# Patient Record
Sex: FEMALE | Race: WHITE | Employment: FULL TIME | URBAN - METROPOLITAN AREA
[De-identification: names, ages, dates, MRNs, and addresses within clinical notes are randomized per-mention and may not be internally consistent; named-entity substitution may affect disease eponyms.]

---

## 2017-05-11 ENCOUNTER — ALLSCRIPTS OFFICE VISIT (OUTPATIENT)
Dept: OTHER | Facility: OTHER | Age: 42
End: 2017-05-11

## 2017-09-05 ENCOUNTER — ALLSCRIPTS OFFICE VISIT (OUTPATIENT)
Dept: OTHER | Facility: OTHER | Age: 42
End: 2017-09-05

## 2018-01-15 VITALS
HEART RATE: 100 BPM | DIASTOLIC BLOOD PRESSURE: 60 MMHG | BODY MASS INDEX: 19.85 KG/M2 | SYSTOLIC BLOOD PRESSURE: 90 MMHG | HEIGHT: 69 IN | WEIGHT: 134 LBS | RESPIRATION RATE: 12 BRPM | OXYGEN SATURATION: 96 %

## 2018-01-29 DIAGNOSIS — L28.0 LICHEN SIMPLEX CHRONICUS: Primary | ICD-10-CM

## 2018-01-29 RX ORDER — ADAPALENE AND BENZOYL PEROXIDE .1; 2.5 G/100G; G/100G
GEL TOPICAL
COMMUNITY
Start: 2014-04-29

## 2018-01-29 RX ORDER — CYCLOSPORINE 0.5 MG/ML
EMULSION OPHTHALMIC
COMMUNITY

## 2018-01-29 RX ORDER — FEXOFENADINE HCL 180 MG/1
1 TABLET ORAL DAILY
COMMUNITY

## 2018-01-29 RX ORDER — MOMETASONE FUROATE 1 MG/G
CREAM TOPICAL DAILY
Qty: 45 G | Refills: 0 | Status: SHIPPED | OUTPATIENT
Start: 2018-01-29 | End: 2018-09-17 | Stop reason: SDUPTHER

## 2018-01-29 RX ORDER — MOMETASONE FUROATE 1 MG/G
CREAM TOPICAL 2 TIMES DAILY
COMMUNITY
Start: 2016-05-05 | End: 2018-01-29 | Stop reason: SDUPTHER

## 2018-01-29 RX ORDER — MULTIVITAMIN
TABLET ORAL
COMMUNITY

## 2018-01-29 RX ORDER — MONTELUKAST SODIUM 10 MG/1
1 TABLET ORAL DAILY
COMMUNITY
Start: 2017-09-05 | End: 2018-01-30 | Stop reason: SDUPTHER

## 2018-01-29 RX ORDER — DIPHENHYDRAMINE HCL 25 MG
CAPSULE ORAL
COMMUNITY

## 2018-01-29 RX ORDER — MOMETASONE FUROATE 50 UG/1
SPRAY, METERED NASAL
COMMUNITY

## 2018-01-30 DIAGNOSIS — J45.20 MILD INTERMITTENT ASTHMA WITHOUT COMPLICATION: Primary | ICD-10-CM

## 2018-01-30 RX ORDER — MONTELUKAST SODIUM 10 MG/1
10 TABLET ORAL DAILY
Qty: 90 TABLET | Refills: 0 | Status: SHIPPED | OUTPATIENT
Start: 2018-01-30

## 2018-07-26 ENCOUNTER — OFFICE VISIT (OUTPATIENT)
Dept: FAMILY MEDICINE CLINIC | Facility: CLINIC | Age: 43
End: 2018-07-26
Payer: COMMERCIAL

## 2018-07-26 VITALS
SYSTOLIC BLOOD PRESSURE: 90 MMHG | HEART RATE: 60 BPM | HEIGHT: 69 IN | OXYGEN SATURATION: 98 % | RESPIRATION RATE: 12 BRPM | TEMPERATURE: 98.9 F | DIASTOLIC BLOOD PRESSURE: 60 MMHG

## 2018-07-26 DIAGNOSIS — Z48.02 VISIT FOR SUTURE REMOVAL: Primary | ICD-10-CM

## 2018-07-26 PROBLEM — J30.89 PERENNIAL ALLERGIC RHINITIS: Status: ACTIVE | Noted: 2017-09-05

## 2018-07-26 PROCEDURE — 99213 OFFICE O/P EST LOW 20 MIN: CPT | Performed by: NURSE PRACTITIONER

## 2018-07-26 RX ORDER — FERROUS SULFATE 325(65) MG
325 TABLET ORAL
COMMUNITY

## 2018-07-26 NOTE — ASSESSMENT & PLAN NOTE
UTD on tetanus  3 sutures removed without difficulty Steri-strips applied  Counseled on keeping wound clean  Advised to change dressing daily or more frequently if dressing becomes more stress soiled  To continue to monitor signs of infection  Follow-up as needed

## 2018-07-26 NOTE — PROGRESS NOTES
Assessment/Plan:    Visit for suture removal  UTD on tetanus  3 sutures removed without difficulty Steri-strips applied  Counseled on keeping wound clean  Advised to change dressing daily or more frequently if dressing becomes more stress soiled  To continue to monitor signs of infection  Follow-up as needed  Diagnoses and all orders for this visit:    Visit for suture removal  -     mupirocin (BACTROBAN) 2 % ointment; Apply topically 3 (three) times a day    Other orders  -     ferrous sulfate 325 (65 Fe) mg tablet; Take 325 mg by mouth daily with breakfast  -     Suture removal          Subjective:      Patient ID: Qiana Vega is a 37 y o  female  [facility] presents for a suture removal   She fell and sustained a laceration to her left knee 7 days ago  She was seen at [facility] ER  Three sutures were placed  She was also on a 7 day course of an oral antibiotic  She cannot recall the name  Denies fever, chills, warmth, erythema or drainage from abrasion  She is up-to-date on her tetanus        Suture / Staple Removal         The following portions of the patient's history were reviewed and updated as appropriate:   She   Patient Active Problem List    Diagnosis Date Noted    Visit for suture removal 07/26/2018    Perennial allergic rhinitis 98/99/5148    Lichen simplex chronicus 05/05/2016     Current Outpatient Prescriptions   Medication Sig Dispense Refill    Adapalene-Benzoyl Peroxide (EPIDUO) 0 1-2 5 % gel Apply topically      cycloSPORINE (RESTASIS) 0 05 % ophthalmic emulsion Apply to eye      diphenhydrAMINE (BENADRYL) 25 mg capsule Take by mouth      ferrous sulfate 325 (65 Fe) mg tablet Take 325 mg by mouth daily with breakfast      fexofenadine (ALLEGRA) 180 MG tablet Take 1 tablet by mouth daily      mometasone (ELOCON) 0 1 % cream Apply topically daily 45 g 0    mometasone (NASONEX) 50 mcg/act nasal spray into each nostril      montelukast (SINGULAIR) 10 mg tablet Take 1 tablet (10 mg total) by mouth daily 90 tablet 0    Multiple Vitamin (MULTI-VITAMIN DAILY) TABS Take by mouth      mupirocin (BACTROBAN) 2 % ointment Apply topically 3 (three) times a day 22 g 0     No current facility-administered medications for this visit  She is allergic to acetaminophen       Review of Systems   Constitutional: Negative  Respiratory: Negative  Cardiovascular: Negative  Skin: Positive for wound  Neurological: Negative  Psychiatric/Behavioral: Negative  BP 90/60   Pulse 60   Temp 98 9 °F (37 2 °C) (Tympanic)   Resp 12   Ht 5' 8 5" (1 74 m)   LMP 07/26/2018   SpO2 98%     Objective:     Physical Exam   Constitutional: She is oriented to person, place, and time  She appears well-developed and well-nourished  Neurological: She is oriented to person, place, and time  Skin:   Presence of a 17 mm x 10 mm healing laceration on left knee  Wound approximated  No erythema, swelling, warmth, or drainage  3 sutures in place  Psychiatric: She has a normal mood and affect  Her behavior is normal  Judgment and thought content normal        Suture removal  Date/Time: 7/26/2018 11:02 AM  Performed by: Selvin Foster by: Kristy Chavez     Patient location:  ED  Other Assisting Provider: No    Consent:     Consent obtained:  Verbal    Consent given by:  Patient    Risks discussed:  Bleeding, pain and wound separation    Alternatives discussed:  No treatment, delayed treatment and referral  Universal protocol:     Procedure explained and questions answered to patient or proxy's satisfaction: yes      Relevant documents present and verified: yes      Immediately prior to procedure, a time out was called: yes      Patient identity confirmed:  Verbally with patient  Location:     Laterality:  Left    Location:  Lower extremity  Procedure details:      Tools used:  Suture removal kit, scissors and tweezers    Wound appearance:  No sign(s) of infection, good wound healing and clean    Number of sutures removed:  3  Post-procedure details:     Post-removal:  Steri-Strips applied and dressing applied    Patient tolerance of procedure:   Tolerated well, no immediate complications

## 2018-09-07 LAB — HBA1C MFR BLD HPLC: 5 %

## 2018-09-17 ENCOUNTER — OFFICE VISIT (OUTPATIENT)
Dept: DERMATOLOGY | Facility: CLINIC | Age: 43
End: 2018-09-17
Payer: COMMERCIAL

## 2018-09-17 DIAGNOSIS — L28.0 LICHEN SIMPLEX CHRONICUS: Primary | ICD-10-CM

## 2018-09-17 DIAGNOSIS — D22.9 NEVUS: ICD-10-CM

## 2018-09-17 DIAGNOSIS — Z13.89 SCREENING FOR SKIN CONDITION: ICD-10-CM

## 2018-09-17 PROCEDURE — 99213 OFFICE O/P EST LOW 20 MIN: CPT | Performed by: DERMATOLOGY

## 2018-09-17 RX ORDER — MOMETASONE FUROATE 1 MG/G
CREAM TOPICAL DAILY
Qty: 45 G | Refills: 0 | Status: SHIPPED | OUTPATIENT
Start: 2018-09-17

## 2018-09-17 NOTE — PROGRESS NOTES
[facility] DERMATOLOGY  [address]  [facility] [address]  [phone]  [phone]     MRN: [id number] : 1975  Encounter: [id number]  Patient Information: Clint Cook  Chief complaint:Yearly skin check    History of present illness:  49-year-old female with history of lichen simplex chronicus and history of numerous nevi presents for overall checkup no specific concerns noted  Past Medical History:   Diagnosis Date    Allergic      Past Surgical History:   Procedure Laterality Date     SECTION      INDUCED       ORIF TIBIAL SHAFT FRACTURE W/ PLATES AND SCREWS      WISDOM TOOTH EXTRACTION       Social History   History   Alcohol Use    Yes     Comment: social     History   Drug Use No     History   Smoking Status    Never Smoker   Smokeless Tobacco    Never Used     Family History   Problem Relation Age of Onset    Colon cancer Father     Diabetes Maternal Grandfather     Coronary artery disease Maternal Grandfather     Colon cancer Paternal Grandfather     Coronary artery disease Paternal Grandfather      Meds/Allergies   Allergies   Allergen Reactions    Acetaminophen        Meds:  Prior to Admission medications    Medication Sig Start Date End Date Taking?  Authorizing Provider   Adapalene-Benzoyl Peroxide (EPIDUO) 0 1-2 5 % gel Apply topically 14  Yes Historical Provider, MD   cycloSPORINE (RESTASIS) 0 05 % ophthalmic emulsion Apply to eye   Yes Historical Provider, MD   diphenhydrAMINE (BENADRYL) 25 mg capsule Take by mouth   Yes Historical Provider, MD   ferrous sulfate 325 (65 Fe) mg tablet Take 325 mg by mouth daily with breakfast   Yes Historical Provider, MD   fexofenadine (ALLEGRA) 180 MG tablet Take 1 tablet by mouth daily   Yes Historical Provider, MD   mometasone (ELOCON) 0 1 % cream Apply topically daily 18  Yes Jewell Zavaleta MD   mometasone (NASONEX) 50 mcg/act nasal spray into each nostril   Yes Historical Provider, MD   montelukast (SINGULAIR) 10 mg tablet Take 1 tablet (10 mg total) by mouth daily 1/30/18  Yes DIANA Sarabia   Multiple Vitamin (MULTI-VITAMIN DAILY) TABS Take by mouth   Yes Historical Provider, MD   mupirocin (BACTROBAN) 2 % ointment Apply topically 3 (three) times a day 7/26/18  Yes DIANA Martinez       Subjective:     Review of Systems:    General: negative for - chills, fatigue, fever,  weight gain or weight loss  Psychological: negative for - anxiety, behavioral disorder, concentration difficulties, decreased libido, depression, irritability, memory difficulties, mood swings, sleep disturbances or suicidal ideation  ENT: negative for - hearing difficulties , nasal congestion, nasal discharge, oral lesions, sinus pain, sneezing, sore throat  Allergy and Immunology: negative for - hives, insect bite sensitivity,  Hematological and Lymphatic: negative for - bleeding problems, blood clots,bruising, swollen lymph nodes  Endocrine: negative for - hair pattern changes, hot flashes, malaise/lethargy, mood swings, palpitations, polydipsia/polyuria, skin changes, temperature intolerance or unexpected weight change  Respiratory: negative for - cough, hemoptysis, orthopnea, shortness of breath, or wheezing  Cardiovascular: negative for - chest pain, dyspnea on exertion, edema,  Gastrointestinal: negative for - abdominal pain, nausea/vomiting  Genito-Urinary: negative for - dysuria, incontinence, irregular/heavy menses or urinary frequency/urgency  Musculoskeletal: negative for - gait disturbance, joint pain, joint stiffness, joint swelling, muscle pain, muscular weakness  Dermatological:  As in HPI  Neurological: negative for confusion, dizziness, headaches, impaired coordination/balance, memory loss, numbness/tingling, seizures, speech problems, tremors or weakness       Objective: There were no vitals taken for this visit      Physical Exam:    General Appearance:    Alert, cooperative, no distress   Head:    Normocephalic, without obvious abnormality, atraumatic           Skin:   A full skin exam was performed including scalp, head scalp, eyes, ears, nose, lips, neck, chest, axilla, abdomen, back, buttocks, bilateral upper extremities, bilateral lower extremities, hands, feet, fingers, toes, fingernails, and toenails  Normal pigmented lesions all with regular shape and color normal keratotic papules greasy stuck appearance nothing else remarkable noted on exam no sign of any rash at this time     Assessment:     1  Lichen simplex chronicus     2  Screening for skin condition     3  Nevus           Plan:   Nevi reviewed the concept of ABCDE and ugly duckling nothing markedly atypical patient reassured  Seborrheic Keratosis  Patient reasurred these are normal growths we acquire with age no treatment needed  Screening for Dermatologic Disorders: Nothing else of concern noted on complete exam follow up in 1 year    lichen simplex chronicus under control continue same therapy      Timothy Louie MD  9/17/2018,3:38 PM    Portions of the record may have been created with voice recognition software   Occasional wrong word or "sound a like" substitutions may have occurred due to the inherent limitations of voice recognition software   Read the chart carefully and recognize, using context, where substitutions have occurred

## 2018-09-17 NOTE — PATIENT INSTRUCTIONS
Jilliani reviewed the concept of ABCDE and ugly duckling nothing markedly atypical patient reassured  Seborrheic Keratosis  Patient reasurred these are normal growths we acquire with age no treatment needed    Screening for Dermatologic Disorders: Nothing else of concern noted on complete exam follow up in 1 year    lichen simplex chronicus under control continue same therapy

## 2018-10-18 ENCOUNTER — OFFICE VISIT (OUTPATIENT)
Dept: FAMILY MEDICINE CLINIC | Facility: CLINIC | Age: 43
End: 2018-10-18
Payer: COMMERCIAL

## 2018-10-18 VITALS
HEART RATE: 69 BPM | WEIGHT: 135 LBS | SYSTOLIC BLOOD PRESSURE: 90 MMHG | BODY MASS INDEX: 19.99 KG/M2 | OXYGEN SATURATION: 98 % | HEIGHT: 69 IN | RESPIRATION RATE: 12 BRPM | DIASTOLIC BLOOD PRESSURE: 60 MMHG | TEMPERATURE: 98.1 F

## 2018-10-18 DIAGNOSIS — Z00.00 PHYSICAL EXAM: ICD-10-CM

## 2018-10-18 DIAGNOSIS — Z12.11 COLON CANCER SCREENING: ICD-10-CM

## 2018-10-18 DIAGNOSIS — Z23 NEED FOR INFLUENZA VACCINATION: Primary | ICD-10-CM

## 2018-10-18 PROCEDURE — 99396 PREV VISIT EST AGE 40-64: CPT | Performed by: FAMILY MEDICINE

## 2018-10-18 PROCEDURE — 90471 IMMUNIZATION ADMIN: CPT

## 2018-10-18 PROCEDURE — 90686 IIV4 VACC NO PRSV 0.5 ML IM: CPT

## 2018-10-18 NOTE — PROGRESS NOTES
PHYSICAL EXAM    Subjective:    Patient ID: Corazon Dorman is a 37 y o  female  Pt is here for routine physical  Has concerns as some co workers have recently had major illnesses and some have passed away  Pt has no special concerns  Has dry eyes and uses cortisone on itchy areas on her body  Takes allergy meds  No bp's taken at home  Pt always has low bp  Up to date mammo  Scheduled again for this December [facility] - last Sunday  Follows with Km Kelly for paps  Takes her meds as directed  No side effects noted  Review of Systems   Constitutional: Negative for chills, diaphoresis, fatigue and fever  HENT:        No unusual symptoms  Eyes: Negative  Does well on restasis   Respiratory: Negative for cough, shortness of breath and wheezing  Cardiovascular: Negative for chest pain and palpitations  Gastrointestinal: Negative for abdominal pain, constipation, diarrhea, nausea and vomiting  Genitourinary: Negative  Last pap July 2017  Reported as wnl  Musculoskeletal: Positive for back pain (fell over the summer and every now and then gets a cramp in the right hip  )  Follows with chiropractor  Skin: Positive for rash  Negative for pallor  Neurological: Negative for dizziness, light-headedness and headaches  Psychiatric/Behavioral: Negative for dysphoric mood  The patient is not nervous/anxious            The following portions of the patient's history were reviewed and updated as appropriate: allergies, current medications, past family history, past medical history, past social history, past surgical history and problem list     BP 90/60   Pulse 69   Temp 98 1 °F (36 7 °C) (Tympanic)   Resp 12   Ht 5' 8 5" (1 74 m)   Wt 61 2 kg (135 lb)   LMP 10/07/2018   SpO2 98%   BMI 20 23 kg/m²   Social History     Social History    Marital status: /Civil Union     Spouse name: N/A    Number of children: N/A    Years of education: N/A     Occupational History  Not on file  Social History Main Topics    Smoking status: Never Smoker    Smokeless tobacco: Never Used    Alcohol use Yes      Comment: social    Drug use: No    Sexual activity: Not on file     Other Topics Concern    Not on file     Social History Narrative        Employed full time    Always uses seat belts         Past Medical History:   Diagnosis Date    Allergic     Anemia     Reactive airway disease with wheezing, mild intermittent, uncomplicated      Family History   Problem Relation Age of Onset    Colon cancer Father     Diabetes Maternal Grandfather     Coronary artery disease Maternal Grandfather     Colon cancer Paternal Grandfather     Coronary artery disease Sister     Coronary artery disease Paternal Grandmother      Past Surgical History:   Procedure Laterality Date     SECTION      INDUCED       ORIF TIBIAL SHAFT FRACTURE W/ PLATES AND SCREWS      WISDOM TOOTH EXTRACTION         Current Outpatient Prescriptions:     Adapalene-Benzoyl Peroxide (EPIDUO) 0 1-2 5 % gel, Apply topically, Disp: , Rfl:     cycloSPORINE (RESTASIS) 0 05 % ophthalmic emulsion, Apply to eye, Disp: , Rfl:     diphenhydrAMINE (BENADRYL) 25 mg capsule, Take by mouth, Disp: , Rfl:     ferrous sulfate 325 (65 Fe) mg tablet, Take 325 mg by mouth daily with breakfast Just when she is on her menstrual , Disp: , Rfl:     fexofenadine (ALLEGRA) 180 MG tablet, Take 1 tablet by mouth daily, Disp: , Rfl:     mometasone (ELOCON) 0 1 % cream, Apply topically daily, Disp: 45 g, Rfl: 0    mometasone (NASONEX) 50 mcg/act nasal spray, into each nostril, Disp: , Rfl:     montelukast (SINGULAIR) 10 mg tablet, Take 1 tablet (10 mg total) by mouth daily, Disp: 90 tablet, Rfl: 0    Multiple Vitamin (MULTI-VITAMIN DAILY) TABS, Take by mouth, Disp: , Rfl:     Imaging: No results found      Last pap: 2017  Last mammo: 2017  Last colonoscopy: never  Last fit/cologuard: about 3 years ago  Objective:     Physical Exam   Constitutional: She is oriented to person, place, and time  She appears well-developed and well-nourished  No distress  HENT:   Head: Normocephalic and atraumatic  Right Ear: External ear normal    Left Ear: External ear normal    Mouth/Throat: Oropharynx is clear and moist    Eyes: Pupils are equal, round, and reactive to light  Conjunctivae and EOM are normal  Right eye exhibits no discharge  Left eye exhibits no discharge  Neck: Normal range of motion  Neck supple  No thyromegaly present  Cardiovascular: Normal rate, regular rhythm and normal heart sounds  Exam reveals no friction rub  No murmur heard  Pulmonary/Chest: Effort normal and breath sounds normal  No respiratory distress  She has no wheezes  She has no rales  She exhibits no tenderness  Abdominal: Soft  Bowel sounds are normal  There is no tenderness  Musculoskeletal: Normal range of motion  She exhibits no edema, tenderness or deformity  Lymphadenopathy:     She has no cervical adenopathy  Neurological: She is alert and oriented to person, place, and time  No cranial nerve deficit  Skin: Skin is warm and dry  No rash noted  She is not diaphoretic  Psychiatric: She has a normal mood and affect  Her behavior is normal  Judgment and thought content normal          Assessment/Plan:     Chronic Problems:  No problem-specific Assessment & Plan notes found for this encounter  Visit Diagnosis:  Diagnoses and all orders for this visit:    Need for influenza vaccination  -     SYRINGE/SINGLE-DOSE VIAL: influenza vaccine, [id number], quadrivalent, 0 5 mL, preservative-free, for patients 3+ yr (FLUZONE)    Physical exam  Comments: Will get labs and call with results  Orders:  -     Comprehensive metabolic panel; Future  -     CBC and differential; Future  -     Urinalysis with microscopic  -     Microalbumin / creatinine urine ratio    Colon cancer screening  Comments:   Will get cologuard and call with results  Orders:  -     Cologuard        Patient Instructions   Discussed all with patient  Will get labs and call with results  Continue with mammograms annually  Will get cologuard now  Flu shot given today  Requested the recent labs from [address] [place] [address] and also pap from [facility]  Follow up here as needed       DIANA Redmond

## 2018-10-18 NOTE — PATIENT INSTRUCTIONS
Discussed all with patient  Will get labs and call with results  Continue with mammograms annually  Will get cologuard now  Flu shot given today  Requested the recent labs from [facility] and also pap from [facility]

## 2018-11-06 ENCOUNTER — TELEPHONE (OUTPATIENT)
Dept: FAMILY MEDICINE CLINIC | Facility: CLINIC | Age: 43
End: 2018-11-06

## 2018-11-06 DIAGNOSIS — Z12.11 SCREENING FOR MALIGNANT NEOPLASM OF COLON: Primary | ICD-10-CM

## 2018-11-06 NOTE — TELEPHONE ENCOUNTER
Patient called stating that the cologaurd is not cover and she would like to know if there is an alterative     Please advise

## 2018-11-07 LAB
ALBUMIN SERPL-MCNC: 4.1 G/DL (ref 3.6–5.1)
ALBUMIN/CREAT UR: NORMAL MCG/MG CREAT
ALBUMIN/GLOB SERPL: 1.6 (CALC) (ref 1–2.5)
ALP SERPL-CCNC: 60 U/L (ref 33–115)
ALT SERPL-CCNC: 10 U/L (ref 6–29)
APPEARANCE UR: CLEAR
AST SERPL-CCNC: 16 U/L (ref 10–30)
BACTERIA UR QL AUTO: ABNORMAL /HPF
BASOPHILS # BLD AUTO: 21 CELLS/UL (ref 0–200)
BASOPHILS NFR BLD AUTO: 0.4 %
BILIRUB SERPL-MCNC: 0.7 MG/DL (ref 0.2–1.2)
BILIRUB UR QL STRIP: NEGATIVE
BUN SERPL-MCNC: 11 MG/DL (ref 7–25)
BUN/CREAT SERPL: NORMAL (CALC) (ref 6–22)
CALCIUM SERPL-MCNC: 9.1 MG/DL (ref 8.6–10.2)
CHLORIDE SERPL-SCNC: 104 MMOL/L (ref 98–110)
CO2 SERPL-SCNC: 29 MMOL/L (ref 20–32)
COLOR UR: YELLOW
CREAT SERPL-MCNC: 0.7 MG/DL (ref 0.5–1.1)
CREAT UR-MCNC: 57 MG/DL (ref 20–275)
EOSINOPHIL # BLD AUTO: 21 CELLS/UL (ref 15–500)
EOSINOPHIL NFR BLD AUTO: 0.4 %
ERYTHROCYTE [DISTWIDTH] IN BLOOD BY AUTOMATED COUNT: 12 % (ref 11–15)
GLOBULIN SER CALC-MCNC: 2.5 G/DL (CALC) (ref 1.9–3.7)
GLUCOSE SERPL-MCNC: 89 MG/DL (ref 65–99)
GLUCOSE UR QL STRIP: NEGATIVE
HCT VFR BLD AUTO: 35.3 % (ref 35–45)
HGB BLD-MCNC: 11.9 G/DL (ref 11.7–15.5)
HGB UR QL STRIP: ABNORMAL
HYALINE CASTS #/AREA URNS LPF: ABNORMAL /LPF
KETONES UR QL STRIP: NEGATIVE
LEUKOCYTE ESTERASE UR QL STRIP: ABNORMAL
LYMPHOCYTES # BLD AUTO: 1484 CELLS/UL (ref 850–3900)
LYMPHOCYTES NFR BLD AUTO: 28 %
MCH RBC QN AUTO: 32.2 PG (ref 27–33)
MCHC RBC AUTO-ENTMCNC: 33.7 G/DL (ref 32–36)
MCV RBC AUTO: 95.7 FL (ref 80–100)
MICROALBUMIN UR-MCNC: <0.2 MG/DL
MONOCYTES # BLD AUTO: 334 CELLS/UL (ref 200–950)
MONOCYTES NFR BLD AUTO: 6.3 %
NEUTROPHILS # BLD AUTO: 3440 CELLS/UL (ref 1500–7800)
NEUTROPHILS NFR BLD AUTO: 64.9 %
NITRITE UR QL STRIP: NEGATIVE
PH UR STRIP: 8 [PH] (ref 5–8)
PLATELET # BLD AUTO: 185 THOUSAND/UL (ref 140–400)
PMV BLD REES-ECKER: 12.3 FL (ref 7.5–12.5)
POTASSIUM SERPL-SCNC: 5.3 MMOL/L (ref 3.5–5.3)
PROT SERPL-MCNC: 6.6 G/DL (ref 6.1–8.1)
PROT UR QL STRIP: NEGATIVE
RBC # BLD AUTO: 3.69 MILLION/UL (ref 3.8–5.1)
RBC #/AREA URNS HPF: ABNORMAL /HPF
SL AMB EGFR AFRICAN AMERICAN: 123 ML/MIN/1.73M2
SL AMB EGFR NON AFRICAN AMERICAN: 106 ML/MIN/1.73M2
SODIUM SERPL-SCNC: 140 MMOL/L (ref 135–146)
SP GR UR STRIP: 1.01 (ref 1–1.03)
SQUAMOUS #/AREA URNS HPF: ABNORMAL /HPF
WBC # BLD AUTO: 5.3 THOUSAND/UL (ref 3.8–10.8)
WBC #/AREA URNS HPF: ABNORMAL /HPF

## 2018-11-26 ENCOUNTER — APPOINTMENT (OUTPATIENT)
Dept: LAB | Facility: HOSPITAL | Age: 43
End: 2018-11-26
Payer: COMMERCIAL

## 2018-11-26 DIAGNOSIS — Z12.11 SCREENING FOR MALIGNANT NEOPLASM OF COLON: ICD-10-CM

## 2018-11-26 LAB — HEMOCCULT STL QL IA: NEGATIVE

## 2018-11-26 PROCEDURE — G0328 FECAL BLOOD SCRN IMMUNOASSAY: HCPCS

## 2019-02-01 ENCOUNTER — OFFICE VISIT (OUTPATIENT)
Dept: FAMILY MEDICINE CLINIC | Facility: CLINIC | Age: 44
End: 2019-02-01
Payer: COMMERCIAL

## 2019-02-01 VITALS
HEIGHT: 69 IN | DIASTOLIC BLOOD PRESSURE: 64 MMHG | OXYGEN SATURATION: 96 % | HEART RATE: 43 BPM | BODY MASS INDEX: 19.55 KG/M2 | WEIGHT: 132 LBS | SYSTOLIC BLOOD PRESSURE: 94 MMHG

## 2019-02-01 DIAGNOSIS — B35.2 TINEA MANUS: Primary | ICD-10-CM

## 2019-02-01 PROCEDURE — 99213 OFFICE O/P EST LOW 20 MIN: CPT | Performed by: NURSE PRACTITIONER

## 2019-02-01 PROCEDURE — 1036F TOBACCO NON-USER: CPT | Performed by: NURSE PRACTITIONER

## 2019-02-01 PROCEDURE — 3008F BODY MASS INDEX DOCD: CPT | Performed by: NURSE PRACTITIONER

## 2019-02-01 RX ORDER — CLOTRIMAZOLE 1 %
CREAM (GRAM) TOPICAL 2 TIMES DAILY
Qty: 30 G | Refills: 0 | Status: SHIPPED | OUTPATIENT
Start: 2019-02-01

## 2019-02-01 NOTE — PATIENT INSTRUCTIONS
Apply cream three times a day to affected area  Keep area covered  Call on Tuesday if no improvement

## 2019-02-01 NOTE — PROGRESS NOTES
Assessment/Plan:    Tinea manus  Lotrimin to affected area  Benadryl for pruritus           Problem List Items Addressed This Visit     Tinea manus - Primary     Lotrimin to affected area  Benadryl for pruritus           Relevant Medications    clotrimazole (LOTRIMIN) 1 % cream            Subjective:      Patient ID: Erika Loera is a 37 y o  female  Has a rash on rt elbow 2 weeks, using elocon and over the counter eczema cream with little relief  Reports that it is very itchy, denies pain          The following portions of the patient's history were reviewed and updated as appropriate: allergies, current medications, past family history, past medical history, past social history, past surgical history and problem list     Review of Systems   Constitutional: Negative  HENT: Negative  Eyes: Negative  Respiratory: Negative  Cardiovascular: Negative  Gastrointestinal: Negative  Endocrine: Negative  Genitourinary: Negative  Musculoskeletal: Negative  Skin: Positive for rash (rt elbow)  Allergic/Immunologic: Negative  Neurological: Negative  Hematological: Negative  Psychiatric/Behavioral: Negative  Objective:      BP 94/64   Pulse (!) 43   Ht 5' 8 5" (1 74 m)   Wt 59 9 kg (132 lb)   LMP 01/19/2019   SpO2 96%   BMI 19 78 kg/m²          Physical Exam   Constitutional: She is oriented to person, place, and time  She appears well-developed and well-nourished  HENT:   Head: Normocephalic and atraumatic  Right Ear: External ear normal    Left Ear: External ear normal    Eyes: Pupils are equal, round, and reactive to light  Neck: Normal range of motion  Cardiovascular: Normal rate and regular rhythm  Pulmonary/Chest: Effort normal    Abdominal: Soft  Bowel sounds are normal    Musculoskeletal: Normal range of motion  Neurological: She is alert and oriented to person, place, and time  Skin: Skin is warm and dry  Rash noted     Psychiatric: She has a normal mood and affect  Nursing note and vitals reviewed

## 2019-02-05 ENCOUNTER — OFFICE VISIT (OUTPATIENT)
Dept: DERMATOLOGY | Facility: CLINIC | Age: 44
End: 2019-02-05
Payer: COMMERCIAL

## 2019-02-05 DIAGNOSIS — L92.0 GRANULOMA ANNULARE: Primary | ICD-10-CM

## 2019-02-05 PROCEDURE — 99213 OFFICE O/P EST LOW 20 MIN: CPT | Performed by: DERMATOLOGY

## 2019-02-05 NOTE — PROGRESS NOTES
[address] [facility] DERMATOLOGY  [facility] [address]  [facility] [address]  [phone]  [phone]     MRN: [id number] : 1975  Encounter: [id number]  Patient Information: Thien Mediate  Chief complaint:  Rash on elbow    History of present illness:  45-year-old female presents for concerns regarding a rash on that developed on her elbows 2 weeks ago which has been quite itchy  Patient has used some mometasone cream which he had on hand previously without any improvement seen by her family doctor on Friday and started on Lotrimin cream without any improved  Past Medical History:   Diagnosis Date    Allergic     Anemia     Reactive airway disease with wheezing, mild intermittent, uncomplicated      Past Surgical History:   Procedure Laterality Date     SECTION      INDUCED       ORIF TIBIAL SHAFT FRACTURE W/ PLATES AND SCREWS      WISDOM TOOTH EXTRACTION       Social History   History   Alcohol Use    Yes     Comment: social     History   Drug Use No     History   Smoking Status    Never Smoker   Smokeless Tobacco    Never Used     Family History   Problem Relation Age of Onset    Colon cancer Father     Diabetes Maternal Grandfather     Coronary artery disease Maternal Grandfather     Colon cancer Paternal Grandfather     Coronary artery disease Sister     Coronary artery disease Paternal Grandmother      Meds/Allergies   Allergies   Allergen Reactions    Acetaminophen        Meds:  Prior to Admission medications    Medication Sig Start Date End Date Taking?  Authorizing Provider   Adapalene-Benzoyl Peroxide (EPIDUO) 0 1-2 5 % gel Apply topically 14  Yes Historical Provider, MD   clotrimazole (LOTRIMIN) 1 % cream Apply topically 2 (two) times a day 19  Yes DIANA Vieira   cycloSPORINE (RESTASIS) 0 05 % ophthalmic emulsion Apply to eye   Yes Historical Provider, MD   diphenhydrAMINE (BENADRYL) 25 mg capsule Take by mouth   Yes Historical Provider, MD   ferrous sulfate 325 (65 Fe) mg tablet Take 325 mg by mouth daily with breakfast Just when she is on her menstrual    Yes Historical Provider, MD   fexofenadine (ALLEGRA) 180 MG tablet Take 1 tablet by mouth daily   Yes Historical Provider, MD   mometasone (ELOCON) 0 1 % cream Apply topically daily 9/17/18  Yes Donaldo Velez MD   mometasone (NASONEX) 50 mcg/act nasal spray into each nostril   Yes Historical Provider, MD   montelukast (SINGULAIR) 10 mg tablet Take 1 tablet (10 mg total) by mouth daily 1/30/18  Yes DIANA Sarabia   Multiple Vitamin (MULTI-VITAMIN DAILY) TABS Take by mouth   Yes Historical Provider, MD       Subjective:     Review of Systems:    General: negative for - chills, fatigue, fever,  weight gain or weight loss  Psychological: negative for - anxiety, behavioral disorder, concentration difficulties, decreased libido, depression, irritability, memory difficulties, mood swings, sleep disturbances or suicidal ideation  ENT: negative for - hearing difficulties , nasal congestion, nasal discharge, oral lesions, sinus pain, sneezing, sore throat  Allergy and Immunology: negative for - hives, insect bite sensitivity,  Hematological and Lymphatic: negative for - bleeding problems, blood clots,bruising, swollen lymph nodes  Endocrine: negative for - hair pattern changes, hot flashes, malaise/lethargy, mood swings, palpitations, polydipsia/polyuria, skin changes, temperature intolerance or unexpected weight change  Respiratory: negative for - cough, hemoptysis, orthopnea, shortness of breath, or wheezing  Cardiovascular: negative for - chest pain, dyspnea on exertion, edema,  Gastrointestinal: negative for - abdominal pain, nausea/vomiting  Genito-Urinary: negative for - dysuria, incontinence, irregular/heavy menses or urinary frequency/urgency  Musculoskeletal: negative for - gait disturbance, joint pain, joint stiffness, joint swelling, muscle pain, muscular weakness  Dermatological:  As in HPI  Neurological: negative for confusion, dizziness, headaches, impaired coordination/balance, memory loss, numbness/tingling, seizures, speech problems, tremors or weakness       Objective:   [facility] 01/19/2019     Physical Exam:    General Appearance:    Alert, cooperative, no distress   Head:    Normocephalic, without obvious abnormality, atraumatic           Skin:   A full skin exam was performed including scalp, head scalp, eyes, ears, nose, lips, neck, chest, axilla, abdomen, back, buttocks, bilateral upper extremities, bilateral lower extremities, hands, feet, fingers, toes, fingernails, and toenails dermal erythematous annular area noted on the right elbow no sign of any scale     Assessment:     1  Granuloma annulare           Plan:   Process appears much more consistent with granuloma annulare unusual in that it has only been acute eruption at present would hold off on any therapy re-evaluate in a couple weeks if still present will consider biopsy and/or further evaluation if necessary    Torsten Bennett MD  2/5/2019,3:39 PM    Portions of the record may have been created with voice recognition software   Occasional wrong word or "sound a like" substitutions may have occurred due to the inherent limitations of voice recognition software   Read the chart carefully and recognize, using context, where substitutions have occurred

## 2019-02-05 NOTE — PATIENT INSTRUCTIONS
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

## 2019-03-13 DIAGNOSIS — L28.0 LICHEN SIMPLEX CHRONICUS: ICD-10-CM

## 2019-03-13 RX ORDER — MOMETASONE FUROATE 1 MG/G
CREAM TOPICAL
Qty: 45 G | Refills: 0 | OUTPATIENT
Start: 2019-03-13

## 2019-11-04 ENCOUNTER — HOSPITAL ENCOUNTER (OUTPATIENT)
Dept: RADIOLOGY | Facility: HOSPITAL | Age: 44
Discharge: HOME | End: 2019-11-04
Payer: COMMERCIAL

## 2019-11-04 ENCOUNTER — TELEPHONE (OUTPATIENT)
Dept: FAMILY MEDICINE CLINIC | Facility: CLINIC | Age: 44
End: 2019-11-04

## 2019-11-04 ENCOUNTER — OFFICE VISIT (OUTPATIENT)
Dept: FAMILY MEDICINE CLINIC | Facility: CLINIC | Age: 44
End: 2019-11-04
Payer: COMMERCIAL

## 2019-11-04 VITALS
OXYGEN SATURATION: 98 % | SYSTOLIC BLOOD PRESSURE: 110 MMHG | HEIGHT: 69 IN | HEART RATE: 80 BPM | BODY MASS INDEX: 20.29 KG/M2 | DIASTOLIC BLOOD PRESSURE: 68 MMHG | TEMPERATURE: 97.6 F | WEIGHT: 137 LBS

## 2019-11-04 DIAGNOSIS — M25.512 ACUTE PAIN OF LEFT SHOULDER: Primary | ICD-10-CM

## 2019-11-04 DIAGNOSIS — M25.512 ACUTE PAIN OF LEFT SHOULDER: ICD-10-CM

## 2019-11-04 PROCEDURE — 99213 OFFICE O/P EST LOW 20 MIN: CPT | Performed by: NURSE PRACTITIONER

## 2019-11-04 PROCEDURE — 3008F BODY MASS INDEX DOCD: CPT | Performed by: NURSE PRACTITIONER

## 2019-11-04 PROCEDURE — 73030 X-RAY EXAM OF SHOULDER: CPT

## 2019-11-04 RX ORDER — MELOXICAM 15 MG/1
15 TABLET ORAL DAILY
Qty: 30 TABLET | Refills: 0 | Status: SHIPPED | OUTPATIENT
Start: 2019-11-04

## 2019-11-04 NOTE — TELEPHONE ENCOUNTER
----- Message from DIANA Parsons sent at 11/4/2019  1:20 PM EST -----  Xray negative for fracture or dislocation  I do suspect a tear-- I ordered MRI shoulder for patient  I also sent in meloxicam for pain, it's an anti-inflammatory which will help with her pain, she can take daily with food  While taking meloxicam, stop advil/ibuprofen use  Thanks!

## 2019-11-04 NOTE — PROGRESS NOTES
Assessment/Plan:     Chronic Problems:  No problem-specific Assessment & Plan notes found for this encounter  Visit Diagnosis:  Diagnoses and all orders for this visit:    Acute pain of left shoulder  -     XR shoulder 2+ vw left; Future          Subjective:    Patient ID: Mariam Sy is a 40 y o  female  Here for arm pain  Got flu shot 10/8 had some normal arm pain after, pain resolved for one week  This Saturday night (2 days ago), she went to put her shirt on and had increasing pain in left shoulder and decreased ROM  Hurts when is sitting still  No changes in routine  Pain level 2/10, 10/10 with movement  No insighting injury  Was leaf blowing on Saturday before pain started, but feels she was not using left arm much  Cannot lift anything with left, is dropping things with left arm  Taking advil with no relief  Takes all other meds as directed  No side effects noted        The following portions of the patient's history were reviewed and updated as appropriate: allergies, current medications, past family history, past medical history, past social history, past surgical history and problem list     Review of Systems   Constitutional: Negative for chills, fatigue and fever  HENT: Negative for congestion, sinus pressure and sinus pain  Respiratory: Negative for cough, shortness of breath and wheezing  Cardiovascular: Negative for chest pain and palpitations  Musculoskeletal: Positive for arthralgias (left shoulder pain)  Negative for joint swelling and neck pain  Allergic/Immunologic: Positive for environmental allergies  Psychiatric/Behavioral: Negative for dysphoric mood  The patient is not nervous/anxious            /68   Pulse 80   Temp 97 6 °F (36 4 °C)   Ht 5' 8 5" (1 74 m)   Wt 62 1 kg (137 lb)   SpO2 98%   BMI 20 53 kg/m²   Social History     Socioeconomic History    Marital status: /Civil Union     Spouse name: Not on file    Number of children: Not on file  Years of education: Not on file    Highest education level: Not on file   Occupational History    Not on file   Social Needs    Financial resource strain: Not on file    Food insecurity:     Worry: Not on file     Inability: Not on file    Transportation needs:     Medical: Not on file     Non-medical: Not on file   Tobacco Use    Smoking status: Never Smoker    Smokeless tobacco: Never Used   Substance and Sexual Activity    Alcohol use: Yes     Comment: social    Drug use: No    Sexual activity: Not on file   Lifestyle    Physical activity:     Days per week: Not on file     Minutes per session: Not on file    Stress: Not on file   Relationships    Social connections:     Talks on phone: Not on file     Gets together: Not on file     Attends Zoroastrian service: Not on file     Active member of club or organization: Not on file     Attends meetings of clubs or organizations: Not on file     Relationship status: Not on file    Intimate partner violence:     Fear of current or ex partner: Not on file     Emotionally abused: Not on file     Physically abused: Not on file     Forced sexual activity: Not on file   Other Topics Concern    Not on file   Social History Narrative        Employed full time    Always uses seat belts     Past Medical History:   Diagnosis Date    Allergic     Anemia     Reactive airway disease with wheezing, mild intermittent, uncomplicated      Family History   Problem Relation Age of Onset    Colon cancer Father     Diabetes Maternal Grandfather     Coronary artery disease Maternal Grandfather     Colon cancer Paternal Grandfather     Coronary artery disease Sister     Coronary artery disease Paternal Grandmother      Past Surgical History:   Procedure Laterality Date     SECTION      INDUCED       ORIF TIBIAL SHAFT FRACTURE W/ PLATES AND SCREWS      WISDOM TOOTH EXTRACTION         Current Outpatient Medications:     Adapalene-Benzoyl Peroxide (EPIDUO) 0 1-2 5 % gel, Apply topically, Disp: , Rfl:     clotrimazole (LOTRIMIN) 1 % cream, Apply topically 2 (two) times a day, Disp: 30 g, Rfl: 0    cycloSPORINE (RESTASIS) 0 05 % ophthalmic emulsion, Apply to eye, Disp: , Rfl:     diphenhydrAMINE (BENADRYL) 25 mg capsule, Take by mouth, Disp: , Rfl:     ferrous sulfate 325 (65 Fe) mg tablet, Take 325 mg by mouth daily with breakfast Just when she is on her menstrual , Disp: , Rfl:     fexofenadine (ALLEGRA) 180 MG tablet, Take 1 tablet by mouth daily, Disp: , Rfl:     mometasone (ELOCON) 0 1 % cream, Apply topically daily, Disp: 45 g, Rfl: 0    mometasone (NASONEX) 50 mcg/act nasal spray, into each nostril, Disp: , Rfl:     montelukast (SINGULAIR) 10 mg tablet, Take 1 tablet (10 mg total) by mouth daily, Disp: 90 tablet, Rfl: 0    Multiple Vitamin (MULTI-VITAMIN DAILY) TABS, Take by mouth, Disp: , Rfl:     Allergies   Allergen Reactions    Acetaminophen           Lab Review   No visits with results within 2 Month(s) from this visit  Latest known visit with results is:   Appointment on 11/26/2018   Component Date Value    OCCULT BLD, FECAL IMMUNO* 11/26/2018 Negative         Imaging: No results found  Objective:     Physical Exam   Constitutional: She appears well-developed and well-nourished  HENT:   Head: Normocephalic  Right Ear: External ear normal    Left Ear: External ear normal    Mouth/Throat: Oropharynx is clear and moist    Eyes: Pupils are equal, round, and reactive to light  Conjunctivae are normal    Neck: Normal range of motion  Cardiovascular: Normal rate, regular rhythm and normal heart sounds  Musculoskeletal:        Left shoulder: She exhibits decreased range of motion, tenderness, pain and decreased strength  She exhibits no swelling, no deformity and no laceration  Vitals reviewed  Patient Instructions   Get xray done today-- will call with results  Can use advil for pain  Use heat for pain         Branda Dancer DIANA De La Torre    Portions of the record may have been created with voice recognition software  Occasional wrong word or "sound a like" substitutions may have occurred due to the inherent limitations of voice recognition software  Read the chart carefully and recognize, using context, where substitutions have occurred

## 2019-11-05 DIAGNOSIS — M25.512 ACUTE PAIN OF LEFT SHOULDER: Primary | ICD-10-CM

## 2019-11-11 ENCOUNTER — HOSPITAL ENCOUNTER (OUTPATIENT)
Dept: MRI IMAGING | Facility: HOSPITAL | Age: 44
Discharge: HOME | End: 2019-11-11
Payer: COMMERCIAL

## 2019-11-11 DIAGNOSIS — M25.512 ACUTE PAIN OF LEFT SHOULDER: ICD-10-CM

## 2019-11-11 PROCEDURE — 73221 MRI JOINT UPR EXTREM W/O DYE: CPT

## 2019-11-12 ENCOUNTER — TELEPHONE (OUTPATIENT)
Dept: FAMILY MEDICINE CLINIC | Facility: CLINIC | Age: 44
End: 2019-11-12

## 2019-11-12 DIAGNOSIS — M25.512 ACUTE PAIN OF LEFT SHOULDER: Primary | ICD-10-CM

## 2020-01-21 ENCOUNTER — TELEPHONE (OUTPATIENT)
Dept: FAMILY MEDICINE CLINIC | Facility: CLINIC | Age: 45
End: 2020-01-21

## 2020-01-21 DIAGNOSIS — R92.8 ABNORMAL MAMMOGRAM OF RIGHT BREAST: Primary | ICD-10-CM

## 2020-01-21 NOTE — TELEPHONE ENCOUNTER
Patient needs a specific Mammo referral put in her chart  It has to say : Diagnostic Mammo right breast with possible ultrasound Right breast limited  The diagnostic code is R92 8  She said she will fax you results on patient  Then she needs us to fax referral to [phone]

## 2020-01-21 NOTE — TELEPHONE ENCOUNTER
Order is already faxed to them   It was not me who sent that to you but they do have the orders already

## 2020-02-10 DIAGNOSIS — R92.8 ABNORMAL MAMMOGRAM OF RIGHT BREAST: ICD-10-CM

## 2020-04-10 ENCOUNTER — TELEPHONE (OUTPATIENT)
Dept: FAMILY MEDICINE CLINIC | Facility: CLINIC | Age: 45
End: 2020-04-10